# Patient Record
Sex: MALE | Race: OTHER | URBAN - METROPOLITAN AREA
[De-identification: names, ages, dates, MRNs, and addresses within clinical notes are randomized per-mention and may not be internally consistent; named-entity substitution may affect disease eponyms.]

---

## 2019-05-01 ENCOUNTER — OFFICE VISIT (OUTPATIENT)
Dept: INTERNAL MEDICINE CLINIC | Facility: CLINIC | Age: 19
End: 2019-05-01
Payer: COMMERCIAL

## 2019-05-01 VITALS
HEART RATE: 85 BPM | WEIGHT: 166 LBS | OXYGEN SATURATION: 96 % | DIASTOLIC BLOOD PRESSURE: 64 MMHG | SYSTOLIC BLOOD PRESSURE: 108 MMHG | BODY MASS INDEX: 26.06 KG/M2 | HEIGHT: 67 IN

## 2019-05-01 DIAGNOSIS — IMO0002 LUMP: Primary | ICD-10-CM

## 2019-05-01 PROCEDURE — 99202 OFFICE O/P NEW SF 15 MIN: CPT | Performed by: INTERNAL MEDICINE

## 2019-05-01 NOTE — PROGRESS NOTES
Pastor Enciso is a 22year old male. Patient presents with:  Referral: bump on L-earlobe, would like to have it removed    HPI:   50-year-old gentleman with no medical history here for evaluation for lump in the left earlobe.   He reports that his left radha Patient Position: Sitting, Cuff Size: adult)   Pulse 85   Ht 5' 7\" (1.702 m)   Wt 166 lb (75.3 kg)   SpO2 96%   BMI 26.00 kg/m²      Physical Exam    Constitutional: He is oriented to person, place, and time. He appears well-nourished.    HENT:   Head: Nor

## 2019-08-26 ENCOUNTER — OFFICE VISIT (OUTPATIENT)
Dept: INTERNAL MEDICINE CLINIC | Facility: CLINIC | Age: 19
End: 2019-08-26
Payer: COMMERCIAL

## 2019-08-26 VITALS
BODY MASS INDEX: 25.74 KG/M2 | WEIGHT: 164 LBS | HEART RATE: 76 BPM | DIASTOLIC BLOOD PRESSURE: 65 MMHG | HEIGHT: 67 IN | SYSTOLIC BLOOD PRESSURE: 111 MMHG

## 2019-08-26 DIAGNOSIS — S01.312S LACERATION OF LEFT EAR, SEQUELA: Primary | ICD-10-CM

## 2019-08-26 PROCEDURE — 99213 OFFICE O/P EST LOW 20 MIN: CPT | Performed by: INTERNAL MEDICINE

## 2019-08-26 NOTE — PROGRESS NOTES
Magdaleno Jo is a 22year old male. Patient presents with:  Suture Removal: L-ear lobe    HPI:   27-year-old male here for suture removal for the earlobe lump removal laceration. He has no complaints. No current outpatient medications on file.    P Normal rate and regular rhythm. No murmur heard. Pulmonary/Chest: Effort normal and breath sounds normal.   Neurological: He is alert and oriented to person, place, and time.    Psychiatric: His behavior is normal.   Earlobe has sutures anteriorly and p

## 2020-09-25 ENCOUNTER — OFFICE VISIT (OUTPATIENT)
Dept: INTERNAL MEDICINE CLINIC | Facility: CLINIC | Age: 20
End: 2020-09-25
Payer: COMMERCIAL

## 2020-09-25 VITALS
BODY MASS INDEX: 29.66 KG/M2 | HEART RATE: 91 BPM | SYSTOLIC BLOOD PRESSURE: 126 MMHG | TEMPERATURE: 98 F | HEIGHT: 67 IN | WEIGHT: 189 LBS | DIASTOLIC BLOOD PRESSURE: 86 MMHG

## 2020-09-25 DIAGNOSIS — Z23 NEED FOR INFLUENZA VACCINATION: ICD-10-CM

## 2020-09-25 DIAGNOSIS — Z00.00 ADULT GENERAL MEDICAL EXAM: Primary | ICD-10-CM

## 2020-09-25 DIAGNOSIS — T78.40XS HYPERSENSITIVITY, SEQUELA: ICD-10-CM

## 2020-09-25 LAB
ALBUMIN SERPL-MCNC: 4.3 G/DL (ref 3.4–5)
ALBUMIN/GLOB SERPL: 1.1 {RATIO} (ref 1–2)
ALP LIVER SERPL-CCNC: 74 U/L
ALT SERPL-CCNC: 43 U/L
ANION GAP SERPL CALC-SCNC: 8 MMOL/L (ref 0–18)
AST SERPL-CCNC: 24 U/L (ref 15–37)
BILIRUB SERPL-MCNC: 0.4 MG/DL (ref 0.1–2)
BUN BLD-MCNC: 12 MG/DL (ref 7–18)
BUN/CREAT SERPL: 12 (ref 10–20)
CALCIUM BLD-MCNC: 9.5 MG/DL (ref 8.5–10.1)
CHLORIDE SERPL-SCNC: 106 MMOL/L (ref 98–112)
CHOLEST SMN-MCNC: 173 MG/DL (ref ?–200)
CO2 SERPL-SCNC: 27 MMOL/L (ref 21–32)
CREAT BLD-MCNC: 1 MG/DL
EST. AVERAGE GLUCOSE BLD GHB EST-MCNC: 91 MG/DL (ref 68–126)
GLOBULIN PLAS-MCNC: 4 G/DL (ref 2.8–4.4)
GLUCOSE BLD-MCNC: 93 MG/DL (ref 70–99)
HBA1C MFR BLD HPLC: 4.8 % (ref ?–5.7)
HDLC SERPL-MCNC: 53 MG/DL (ref 40–59)
LDLC SERPL CALC-MCNC: 76 MG/DL (ref ?–100)
M PROTEIN MFR SERPL ELPH: 8.3 G/DL (ref 6.4–8.2)
NONHDLC SERPL-MCNC: 120 MG/DL (ref ?–130)
OSMOLALITY SERPL CALC.SUM OF ELEC: 291 MOSM/KG (ref 275–295)
PATIENT FASTING Y/N/NP: YES
PATIENT FASTING Y/N/NP: YES
POTASSIUM SERPL-SCNC: 4 MMOL/L (ref 3.5–5.1)
SODIUM SERPL-SCNC: 141 MMOL/L (ref 136–145)
TRIGL SERPL-MCNC: 218 MG/DL (ref 30–149)
TSI SER-ACNC: 0.78 MIU/ML (ref 0.36–3.74)
VLDLC SERPL CALC-MCNC: 44 MG/DL (ref 0–30)

## 2020-09-25 PROCEDURE — 90471 IMMUNIZATION ADMIN: CPT | Performed by: INTERNAL MEDICINE

## 2020-09-25 PROCEDURE — 3079F DIAST BP 80-89 MM HG: CPT | Performed by: INTERNAL MEDICINE

## 2020-09-25 PROCEDURE — 36415 COLL VENOUS BLD VENIPUNCTURE: CPT | Performed by: INTERNAL MEDICINE

## 2020-09-25 PROCEDURE — 3008F BODY MASS INDEX DOCD: CPT | Performed by: INTERNAL MEDICINE

## 2020-09-25 PROCEDURE — 90686 IIV4 VACC NO PRSV 0.5 ML IM: CPT | Performed by: INTERNAL MEDICINE

## 2020-09-25 PROCEDURE — 99395 PREV VISIT EST AGE 18-39: CPT | Performed by: INTERNAL MEDICINE

## 2020-09-25 PROCEDURE — 3074F SYST BP LT 130 MM HG: CPT | Performed by: INTERNAL MEDICINE

## 2020-09-26 RX ORDER — EPINEPHRINE 0.3 MG/.3ML
0.3 INJECTION SUBCUTANEOUS ONCE
Qty: 1 EACH | Refills: 0 | Status: SHIPPED | OUTPATIENT
Start: 2020-09-26 | End: 2020-09-26

## 2020-09-26 NOTE — PROGRESS NOTES
Debra Simmons is a 20year old male. Patient presents with:  Physical    HPI:   70-year-old gentleman with no significant medical history here for physical.  He has no complaints. He is accompanied by his mom.   Patient gave permission for mom to be inside Negative for chest pain, palpitations and leg swelling. Gastrointestinal: Negative for vomiting, abdominal pain, diarrhea, constipation, blood in stool and abdominal distention. Endocrine: Negative for cold intolerance and heat intolerance.    Erum Doran His behavior is normal. Judgment normal.         ASSESSMENT AND PLAN:   1. Adult general medical exam  Encourage patient to eat healthy with fruits and vegetables. Avoid too much of sweets and carbohydrates.   I encourage patient exercise for 30 to 40 marek

## 2020-09-28 LAB
BAKER'S YEAST IGE QN: <0.1 KUA/L (ref ?–0.1)
BARLEY IGE QN: <0.1 KUA/L (ref ?–0.1)
BEEF IGE QN: 0.29 KUA/L (ref ?–0.1)
CHICKEN MEAT IGE QN: <0.1 KUA/L (ref ?–0.1)
COCOA IGE QN: <0.1 KUA/L (ref ?–0.1)
CORN IGE QN: <0.1 KUA/L (ref ?–0.1)
COW MILK IGE QN: 0.45 KUA/L (ref ?–0.1)
EGG WHITE IGE QN: <0.1 KUA/L (ref ?–0.1)
IGE SERPL-ACNC: 158 KU/L (ref 2–214)
LETTUCE IGE QN: <0.1 KUA/L (ref ?–0.1)
MALT IGE QN: <0.1 KUA/L (ref ?–0.1)
OAT IGE QN: <0.1 KUA/L (ref ?–0.1)
PEANUT IGE QN: <0.1 KUA/L (ref ?–0.1)
PECAN/HICK NUT IGE QN: <0.1 KUA/L (ref ?–0.1)
PORK IGE QN: <0.1 KUA/L (ref ?–0.1)
POTATO IGE QN: <0.1 KUA/L (ref ?–0.1)
RYE IGE QN: <0.1 KUA/L (ref ?–0.1)
SHRIMP IGE QN: <0.1 KUA/L (ref ?–0.1)
SOYBEAN IGE QN: <0.1 KUA/L (ref ?–0.1)
TOMATO IGE QN: <0.1 KUA/L (ref ?–0.1)
WHEAT IGE QN: <0.1 KUA/L (ref ?–0.1)

## 2020-09-29 NOTE — PROGRESS NOTES
Blood work for food allergen reviewed. It shows that his levels are high for beef and milk. Peanut is in normal limits.   If they would like to see an allergy specialist, please let me know I will be happy to give a referral.  Thank you    Please let sean

## 2021-03-22 ENCOUNTER — TELEPHONE (OUTPATIENT)
Dept: INTERNAL MEDICINE CLINIC | Facility: CLINIC | Age: 21
End: 2021-03-22

## 2021-03-22 RX ORDER — FLUTICASONE PROPIONATE AND SALMETEROL 100; 50 UG/1; UG/1
1 POWDER RESPIRATORY (INHALATION) 2 TIMES DAILY
Qty: 1 EACH | Refills: 1 | Status: SHIPPED | OUTPATIENT
Start: 2021-03-22 | End: 2021-04-13

## 2021-03-22 NOTE — TELEPHONE ENCOUNTER
Talked to mom, provided with advair, if no improvement, will proceed with imaging. This most likely an allergic cough.

## 2021-03-22 NOTE — TELEPHONE ENCOUNTER
Patient c/o Dry cough x 1 month,no sob, no wheezing, no other symptoms. Started with a cold, only cough remains. Taking Singulair, Claritin and robitussin.

## 2021-04-13 RX ORDER — FLUTICASONE PROPIONATE AND SALMETEROL 100; 50 UG/1; UG/1
POWDER RESPIRATORY (INHALATION)
Qty: 60 EACH | Refills: 1 | Status: SHIPPED | OUTPATIENT
Start: 2021-04-13

## 2024-09-18 ENCOUNTER — OFFICE VISIT (OUTPATIENT)
Dept: INTERNAL MEDICINE CLINIC | Facility: CLINIC | Age: 24
End: 2024-09-18

## 2024-09-18 VITALS
DIASTOLIC BLOOD PRESSURE: 83 MMHG | HEART RATE: 77 BPM | TEMPERATURE: 98 F | OXYGEN SATURATION: 97 % | HEIGHT: 67 IN | WEIGHT: 242 LBS | BODY MASS INDEX: 37.98 KG/M2 | SYSTOLIC BLOOD PRESSURE: 126 MMHG

## 2024-09-18 DIAGNOSIS — E55.9 VITAMIN D DEFICIENCY: ICD-10-CM

## 2024-09-18 DIAGNOSIS — F41.9 ANXIETY: ICD-10-CM

## 2024-09-18 DIAGNOSIS — Z00.00 ANNUAL PHYSICAL EXAM: Primary | ICD-10-CM

## 2024-09-18 DIAGNOSIS — E53.8 B12 DEFICIENCY: ICD-10-CM

## 2024-09-18 LAB
ALBUMIN SERPL-MCNC: 5 G/DL (ref 3.2–4.8)
ALBUMIN/GLOB SERPL: 1.5 {RATIO} (ref 1–2)
ALP LIVER SERPL-CCNC: 96 U/L
ALT SERPL-CCNC: 100 U/L
ANION GAP SERPL CALC-SCNC: 9 MMOL/L (ref 0–18)
AST SERPL-CCNC: 43 U/L (ref ?–34)
BASOPHILS # BLD AUTO: 0.04 X10(3) UL (ref 0–0.2)
BASOPHILS NFR BLD AUTO: 0.5 %
BILIRUB SERPL-MCNC: 0.5 MG/DL (ref 0.3–1.2)
BUN BLD-MCNC: 12 MG/DL (ref 9–23)
BUN/CREAT SERPL: 13.3 (ref 10–20)
CALCIUM BLD-MCNC: 9.6 MG/DL (ref 8.7–10.4)
CHLORIDE SERPL-SCNC: 108 MMOL/L (ref 98–112)
CHOLEST SERPL-MCNC: 211 MG/DL (ref ?–200)
CO2 SERPL-SCNC: 22 MMOL/L (ref 21–32)
CREAT BLD-MCNC: 0.9 MG/DL
DEPRECATED RDW RBC AUTO: 41.4 FL (ref 35.1–46.3)
EGFRCR SERPLBLD CKD-EPI 2021: 122 ML/MIN/1.73M2 (ref 60–?)
EOSINOPHIL # BLD AUTO: 0.19 X10(3) UL (ref 0–0.7)
EOSINOPHIL NFR BLD AUTO: 2.3 %
ERYTHROCYTE [DISTWIDTH] IN BLOOD BY AUTOMATED COUNT: 11.9 % (ref 11–15)
FASTING PATIENT LIPID ANSWER: YES
FASTING STATUS PATIENT QL REPORTED: YES
GLOBULIN PLAS-MCNC: 3.4 G/DL (ref 2–3.5)
GLUCOSE BLD-MCNC: 96 MG/DL (ref 70–99)
HCT VFR BLD AUTO: 51.2 %
HDLC SERPL-MCNC: 52 MG/DL (ref 40–59)
HGB BLD-MCNC: 17.4 G/DL
IMM GRANULOCYTES # BLD AUTO: 0.02 X10(3) UL (ref 0–1)
IMM GRANULOCYTES NFR BLD: 0.2 %
LDLC SERPL CALC-MCNC: 120 MG/DL (ref ?–100)
LYMPHOCYTES # BLD AUTO: 2.04 X10(3) UL (ref 1–4)
LYMPHOCYTES NFR BLD AUTO: 24.2 %
MCH RBC QN AUTO: 32 PG (ref 26–34)
MCHC RBC AUTO-ENTMCNC: 34 G/DL (ref 31–37)
MCV RBC AUTO: 94.3 FL
MONOCYTES # BLD AUTO: 0.54 X10(3) UL (ref 0.1–1)
MONOCYTES NFR BLD AUTO: 6.4 %
NEUTROPHILS # BLD AUTO: 5.59 X10 (3) UL (ref 1.5–7.7)
NEUTROPHILS # BLD AUTO: 5.59 X10(3) UL (ref 1.5–7.7)
NEUTROPHILS NFR BLD AUTO: 66.4 %
NONHDLC SERPL-MCNC: 159 MG/DL (ref ?–130)
OSMOLALITY SERPL CALC.SUM OF ELEC: 288 MOSM/KG (ref 275–295)
PLATELET # BLD AUTO: 315 10(3)UL (ref 150–450)
POTASSIUM SERPL-SCNC: 4.1 MMOL/L (ref 3.5–5.1)
PROT SERPL-MCNC: 8.4 G/DL (ref 5.7–8.2)
RBC # BLD AUTO: 5.43 X10(6)UL
SODIUM SERPL-SCNC: 139 MMOL/L (ref 136–145)
TRIGL SERPL-MCNC: 221 MG/DL (ref 30–149)
TSI SER-ACNC: 1.77 MIU/ML (ref 0.55–4.78)
VIT B12 SERPL-MCNC: 295 PG/ML (ref 211–911)
VLDLC SERPL CALC-MCNC: 39 MG/DL (ref 0–30)
WBC # BLD AUTO: 8.4 X10(3) UL (ref 4–11)

## 2024-09-18 PROCEDURE — 99395 PREV VISIT EST AGE 18-39: CPT | Performed by: INTERNAL MEDICINE

## 2024-09-18 PROCEDURE — 90715 TDAP VACCINE 7 YRS/> IM: CPT | Performed by: INTERNAL MEDICINE

## 2024-09-18 PROCEDURE — 36415 COLL VENOUS BLD VENIPUNCTURE: CPT | Performed by: INTERNAL MEDICINE

## 2024-09-18 PROCEDURE — 90471 IMMUNIZATION ADMIN: CPT | Performed by: INTERNAL MEDICINE

## 2024-09-18 RX ORDER — HYDROXYZINE PAMOATE 25 MG/1
25 CAPSULE ORAL DAILY
COMMUNITY
Start: 2024-08-10

## 2024-09-18 RX ORDER — ESCITALOPRAM OXALATE 10 MG/1
10 TABLET ORAL DAILY
COMMUNITY
Start: 2024-08-11

## 2024-09-18 NOTE — PROGRESS NOTES
Roc Parsons is a 23 year old male who presents for a complete physical exam.   HPI:   Pt  denies any complains   He has anxiety ,he was seen by psychiatry few weeks ago and started medications      Wt Readings from Last 3 Encounters:   09/18/24 242 lb (109.8 kg)   09/25/20 189 lb (85.7 kg)   08/26/19 164 lb (74.4 kg) (63%, Z= 0.34)*     * Growth percentiles are based on [facility] (Boys, 2-20 Years) data.     Body mass index is 37.9 kg/m².     Cholesterol, Total (mg/dL)   Date Value   09/25/2020 173     HDL Cholesterol (mg/dL)   Date Value   09/25/2020 53     LDL Cholesterol (mg/dL)   Date Value   09/25/2020 76     AST (U/L)   Date Value   09/25/2020 24     ALT (U/L)   Date Value   09/25/2020 43      Current Outpatient Medications   Medication Sig Dispense Refill    escitalopram 10 MG Oral Tab Take 1 tablet (10 mg total) by mouth daily.      hydrOXYzine Pamoate 25 MG Oral Cap Take 1 capsule (25 mg total) by mouth daily. (Patient not taking: Reported on 9/18/2024)      fluticasone-salmeterol 100-50 MCG/DOSE Inhalation Aerosol Powder, Breath Activated TAKE 1 PUFF BY MOUTH TWICE A DAY (Patient not taking: Reported on 9/18/2024) 60 each 1      Past Medical History:    Anxiety    social anxiety    OCD (obsessive compulsive disorder)      History reviewed. No pertinent surgical history.   Family History   Problem Relation Age of Onset    No Known Problems Father     Hypertension Mother     Hypertension Maternal Grandmother     Other (Other) Maternal Grandmother     Cancer Maternal Grandfather     Other (Other) Paternal Grandmother     Depression Sister     Anxiety Sister     Cancer Maternal Great-Grandmother       Social History:  Social History     Socioeconomic History    Marital status: Single   Tobacco Use    Smoking status: Never     Passive exposure: Never    Smokeless tobacco: Never   Vaping Use    Vaping status: Never Used   Substance and Sexual Activity    Alcohol use: Never    Drug use: Never    Sexual activity: Not  Currently     Partners: Female      Exercise: three times per week.  Diet: watches minimally     REVIEW OF SYSTEMS:     Constitutional Negative Chills, fatigue and fever.   ENMT Negative Hearing loss, nasal drainage, pain in/around ear, sinus pressure and sore throat.   Eyes Negative Eye pain and vision changes.   Respiratory Negative Cough, dyspnea and wheezing.   Cardio Negative Chest pain, claudication, edema and irregular heartbeat/palpitations.   GI Negative Abdominal pain, blood in stool, constipation, diarrhea, heartburn, nausea and vomiting.    Negative Dysuria, hematuria, nocturia   Endocrine Negative Cold intolerance and heat intolerance.   Neuro Negative Dizziness, extremity weakness, headache, memory impairment, numbness in extremities, seizures and tremors.   Psych Negative Anxiety, depression and insomnia.   Integumentary Negative Rash, changing skin lesions   MS Negative Back pain and joint pain.   Hema/Lymph Negative Easy bleeding, easy bruising and thromboembolic events.   Allergic/Immuno Negative Environmental allergies, food allergies and seasonal allergies.       EXAM:   /83 (BP Location: Left arm, Patient Position: Sitting, Cuff Size: large)   Pulse 77   Temp 98 °F (36.7 °C) (Temporal)   Ht 5' 7\" (1.702 m)   Wt 242 lb (109.8 kg)   SpO2 97%   BMI 37.90 kg/m²   Body mass index is 37.9 kg/m².   Constitutional Normal Well developed.   Eyes Normal Pupil - Right: Normal, Left: Normal.   Ears Normal Inspection - Right: Normal, Left: Normal. Canal - Right: Normal, Left: Normal. TM - Right: Normal, Left: Normal. Hearing - Right: Normal, Left: Normal.   Nasopharynx Normal External nose - Normal. Lips/teeth/gums - Normal. Oropharynx - Normal.   Neck Exam Normal Palpation - Normal. No thyromegaly   Respiratory Normal Auscultation - Normal, no wheezes or rales   Cardiovascular Normal Regular rate and rhythm. No murmurs, gallops, or rubs   Vascular Normal Pulses - Dorsalis pedis: Normal. Bruits  - Carotids: Absent.    Extremity Normal No edema. No varicosities.   Abdomen Normal Inspection - Normal. Auscultation - Normal. No abdominal tenderness.   Musculoskeletal Normal Overview - Normal.   Skin Normal Inspection - Normal.   Neurological Normal Memory - Normal. Sensory - Normal. Motor - Normal. Balance & gait - Normal.   Psychiatric Normal Orientation - Oriented to time, place, person & situation. Appropriate mood and affect.       ASSESSMENT AND PLAN:   Roc Parsons is a 23 year old male who presents for a complete physical exam. Pt's weight is Body mass index is 37.9 kg/m²., recommended low fat diet and aerobic exercise 30 minutes three times weekly.   Health maintenance: patient is due for   blood work    Anxiety - continue with current medication   T dep  Discussed regular exercise, low fat diet, The patient indicates understanding of these issues and agrees to the plan.  The patient is asked to return for annual physical in 1 year.

## 2024-09-19 LAB
EST. AVERAGE GLUCOSE BLD GHB EST-MCNC: 100 MG/DL (ref 68–126)
HBA1C MFR BLD: 5.1 % (ref ?–5.7)
VIT D+METAB SERPL-MCNC: 12.4 NG/ML (ref 30–100)